# Patient Record
(demographics unavailable — no encounter records)

---

## 2025-03-07 NOTE — HISTORY OF PRESENT ILLNESS
[FreeTextEntry1] : np eczema [de-identified] : Referred by: Dr. Lisa Junior   Ms. GRISEL BERNAL  is a 8 month old F here for evaluation of below  #rash on face and body x months ago, becoming more pruritic mom w/ h/o bad eczema as a child  S: dove sens baby  M: aquaphor, vaseline, eucerin D: audrey, +fb no wipes to face/hands

## 2025-03-07 NOTE — ASSESSMENT
[Use of independent historian: [ enter independent historian's relationship to patient ] :____] : As the patient was unable to provide a complete and reliable history, I obtained clinical history from the patient's [unfilled] [FreeTextEntry1] : # Atopic dermatitis, flared with #xerosis discussed nature, chronicity and unpredictable course Reviewed dry skin care, including bathing routines, emollients, and fragrance-free products. - Advised moisturizing w/ plain Vaseline. - start triamcinolone 0.1% ointment BID to AA on body PRN roughness.  - start hydrocortisone 2.5% ointment BID to AA on face PRN roughness.  - Reviewed SE of topical steroids including skin thinning and discoloration and discussed avoidance of prolonged use. - start dilute bleach baths. Instructions provided. - start wet wraps qHS. Instructions provided. - fragrance-free diapers.   RTC 2 weeks

## 2025-03-07 NOTE — PHYSICAL EXAM
[Alert] : alert [FreeTextEntry3] : eczematous plaques on face, trunk, extremities mild thin pink patches on buttocks and mons sparing inguinal folds xerosis

## 2025-03-07 NOTE — CONSULT LETTER
[Dear  ___] : Dear  [unfilled], [Consult Letter:] : I had the pleasure of evaluating your patient, [unfilled]. [Please see my note below.] : Please see my note below. [Consult Closing:] : Thank you very much for allowing me to participate in the care of this patient.  If you have any questions, please do not hesitate to contact me. [Sincerely,] : Sincerely, [FreeTextEntry3] : Yecenia Tang MD Department of Dermatology [facility]

## 2025-03-07 NOTE — HISTORY OF PRESENT ILLNESS
[FreeTextEntry1] : np eczema [de-identified] : Referred by: Dr. Lisa Junior   Ms. GRISEL BERNAL  is a 8 month old F here for evaluation of below  #rash on face and body x months ago, becoming more pruritic mom w/ h/o bad eczema as a child  S: dove sens baby  M: aquaphor, vaseline, eucerin D: audrey, +fb no wipes to face/hands

## 2025-03-07 NOTE — CONSULT LETTER
[Dear  ___] : Dear  [unfilled], [Consult Letter:] : I had the pleasure of evaluating your patient, [unfilled]. [Please see my note below.] : Please see my note below. [Consult Closing:] : Thank you very much for allowing me to participate in the care of this patient.  If you have any questions, please do not hesitate to contact me. [Sincerely,] : Sincerely, [FreeTextEntry3] : Yecenia Tang MD Department of Dermatology [facility]

## 2025-03-20 NOTE — ASSESSMENT
[Use of independent historian: [ enter independent historian's relationship to patient ] :____] : As the patient was unable to provide a complete and reliable history, I obtained clinical history from the patient's [unfilled] [FreeTextEntry1] : # Atopic dermatitis, improved but still active/flaring, not at treatment goal with #xerosis discussed nature, chronicity and unpredictable course Reviewed dry skin care, including bathing routines, emollients, and fragrance-free products. recommend applying liberal amounts of plain vaseline to cheeks, especially before and after meals avoid wipes to the face after meals can cleanse with water and pat dry with a clean towel/cloth - can start eucrisa to any minimally rough areas if covered, SED including initial stinging sensation - if not covered can try tacrolimus BID to AA on face/body, SED - triamcinolone 0.1% ointment BID to AA on body PRN roughness.  - hydrocortisone 2.5% ointment BID to AA on face PRN roughness.  - Reviewed SE of topical steroids including skin thinning and discoloration and discussed avoidance of prolonged use. - dilute bleach baths BIW for maintenance.  - restart boot camp for severe flares   RTC 3m

## 2025-03-20 NOTE — PHYSICAL EXAM
[Alert] : alert [FreeTextEntry3] : eczematous patches on cheeks, arms rare pustules scattered on neck and leg xerosis

## 2025-03-20 NOTE — CONSULT LETTER
[Dear  ___] : Dear  [unfilled], [Consult Letter:] : I had the pleasure of evaluating your patient, [unfilled]. [Please see my note below.] : Please see my note below. [Consult Closing:] : Thank you very much for allowing me to participate in the care of this patient.  If you have any questions, please do not hesitate to contact me. [Sincerely,] : Sincerely, [FreeTextEntry3] : Yecenia Tang MD Department of Dermatology [facility]

## 2025-03-20 NOTE — HISTORY OF PRESENT ILLNESS
[FreeTextEntry1] : np eczema [de-identified] : Referred by: Dr. Lisa Junior   Ms. GRISEL BERNAL  is a 8 month old F here for evaluation of below  #AD on face and body - improved significantly with boot camp, developed a few pimples on body, also just started flaring again on cheeks but not as bad as before. dad is concerned about the use of TCS hx: present x months ago, becoming more pruritic mom w/ h/o bad eczema as a child  S: dove sens baby  M: aquaphor, vaseline, eucerin D: audrey, +fb no wipes to face/hands

## 2025-07-30 NOTE — CONSULT LETTER
[Dear  ___] : Dear  [unfilled], [Consult Letter:] : I had the pleasure of evaluating your patient, [unfilled]. [Please see my note below.] : Please see my note below. [Consult Closing:] : Thank you very much for allowing me to participate in the care of this patient.  If you have any questions, please do not hesitate to contact me. [Sincerely,] : Sincerely, [FreeTextEntry3] : Yecenia Tang MD Department of Dermatology [facility]

## 2025-07-30 NOTE — CONSULT LETTER
[Dear  ___] : Dear  [unfilled], [Consult Letter:] : I had the pleasure of evaluating your patient, [unfilled]. [Please see my note below.] : Please see my note below. [Consult Closing:] : Thank you very much for allowing me to participate in the care of this patient.  If you have any questions, please do not hesitate to contact me. [Sincerely,] : Sincerely, [FreeTextEntry3] : Yecenia Tang MD Department of Dermatology [facility]

## 2025-07-30 NOTE — ASSESSMENT
[Use of independent historian: [ enter independent historian's relationship to patient ] :____] : As the patient was unable to provide a complete and reliable history, I obtained clinical history from the patient's [unfilled] [FreeTextEntry1] : #eczema coxsackium f/u hsv/vzv pcr and bact cx supportive care stay hydrated start triamcinolone 0.1% ointment BID to AA on body PRN pruritus, SED  # Atopic dermatitis, improved but still active/flaring, not at treatment goal with #xerosis discussed nature, chronicity and unpredictable course Reviewed dry skin care, including bathing routines, emollients, and fragrance-free products. recommend applying liberal amounts of plain vaseline to cheeks, especially before and after meals avoid wipes to the face after meals can cleanse with water and pat dry with a clean towel/cloth eucrisa not covered  start tacrolimus BID to AA on face or body, SED including burning sensation and black box warning - triamcinolone 0.1% ointment BID to AA on body PRN roughness.  - hydrocortisone 2.5% ointment BID to AA on face PRN roughness.  - Reviewed SE of topical steroids including skin thinning and discoloration and discussed avoidance of prolonged use.   RTC

## 2025-07-30 NOTE — HISTORY OF PRESENT ILLNESS
[FreeTextEntry1] : np eczema [de-identified] : Referred by: Dr. Lisa Junior   Ms. GRISEL BERNAL  is an 13 month old F here for evaluation of below #rash on arms since today, had dots on feet x weeks. fever started yest to 102, found to have b/l AOM, started amoxicillin last night. no rhinorrhea,cough, had diarrhea today  #AD on face and body - improved, have not used TAC recently. occ using HC to spots on face S: baby dove hypoallergic M: eucerin cream, hydrogel D: ?FF, no fs/ds/wb/fb  3/20/25: imp significantly with boot camp, developed a few pimples on body, also just started flaring again on cheeks but not as bad as before. dad is concerned about the use of TCS hx: present x months ago, becoming more pruritic mom w/ h/o bad eczema as a child  S: dove sens baby  M: aquaphor, vaseline, eucerin D: dreft, +fb no wipes to face/hands

## 2025-07-30 NOTE — HISTORY OF PRESENT ILLNESS
[FreeTextEntry1] : np eczema [de-identified] : Referred by: Dr. Lisa Junior   Ms. GRISEL BERNAL  is an 13 month old F here for evaluation of below #rash on arms since today, had dots on feet x weeks. fever started yest to 102, found to have b/l AOM, started amoxicillin last night. no rhinorrhea,cough, had diarrhea today  #AD on face and body - improved, have not used TAC recently. occ using HC to spots on face S: baby dove hypoallergic M: eucerin cream, hydrogel D: ?FF, no fs/ds/wb/fb  3/20/25: imp significantly with boot camp, developed a few pimples on body, also just started flaring again on cheeks but not as bad as before. dad is concerned about the use of TCS hx: present x months ago, becoming more pruritic mom w/ h/o bad eczema as a child  S: dove sens baby  M: aquaphor, vaseline, eucerin D: dreft, +fb no wipes to face/hands

## 2025-07-30 NOTE — PHYSICAL EXAM
[Alert] : alert [FreeTextEntry3] : multiple pink papules and vesicles on arms, legs > trunk oral mucosa appears clear multiple scattered eczematous patches on arms, trunk, legs mostly sparing face xerosis